# Patient Record
Sex: FEMALE | NOT HISPANIC OR LATINO | URBAN - NONMETROPOLITAN AREA
[De-identification: names, ages, dates, MRNs, and addresses within clinical notes are randomized per-mention and may not be internally consistent; named-entity substitution may affect disease eponyms.]

---

## 2018-01-16 ENCOUNTER — OFFICE VISIT CONVERTED (OUTPATIENT)
Dept: FAMILY MEDICINE CLINIC | Age: 63
End: 2018-01-16
Attending: NURSE PRACTITIONER

## 2018-01-23 ENCOUNTER — CONVERSION ENCOUNTER (OUTPATIENT)
Dept: OTHER | Facility: HOSPITAL | Age: 63
End: 2018-01-23

## 2018-02-14 ENCOUNTER — CONVERSION ENCOUNTER (OUTPATIENT)
Dept: MAMMOGRAPHY | Facility: HOSPITAL | Age: 63
End: 2018-02-14

## 2018-09-06 ENCOUNTER — OFFICE VISIT CONVERTED (OUTPATIENT)
Dept: FAMILY MEDICINE CLINIC | Age: 63
End: 2018-09-06
Attending: NURSE PRACTITIONER

## 2019-03-04 ENCOUNTER — HOSPITAL ENCOUNTER (OUTPATIENT)
Dept: OTHER | Facility: HOSPITAL | Age: 64
Discharge: HOME | End: 2019-03-04
Attending: NURSE PRACTITIONER

## 2019-03-04 ENCOUNTER — OFFICE VISIT CONVERTED (OUTPATIENT)
Dept: FAMILY MEDICINE CLINIC | Age: 64
End: 2019-03-04
Attending: NURSE PRACTITIONER

## 2019-03-04 LAB
ANION GAP SERPL CALC-SCNC: 17 MMOL/L (ref 8–19)
BUN SERPL-MCNC: 10 MG/DL (ref 5–25)
BUN/CREAT SERPL: 11 {RATIO} (ref 6–20)
CALCIUM SERPL-MCNC: 9.9 MG/DL (ref 8.7–10.4)
CHLORIDE SERPL-SCNC: 101 MMOL/L (ref 99–111)
CONV CO2: 27 MMOL/L (ref 22–32)
CREAT UR-MCNC: 0.9 MG/DL (ref 0.5–0.9)
GFR SERPLBLD BASED ON 1.73 SQ M-ARVRAT: >60 ML/MIN/{1.73_M2}
GLUCOSE SERPL-MCNC: 97 MG/DL (ref 65–99)
OSMOLALITY SERPL CALC.SUM OF ELEC: 291 MOSM/KG (ref 273–304)
POTASSIUM SERPL-SCNC: 3.8 MMOL/L (ref 3.5–5.3)
SODIUM SERPL-SCNC: 141 MMOL/L (ref 135–147)

## 2019-03-06 LAB
ALP SERPL-CCNC: 125 U/L (ref 43–160)
ASO AB SERPL-ACNC: 78 [IU]/ML (ref 0–200)
CONV ANTI NUCLEAR ANTIBODY WITH REFLEX: NEGATIVE
CONV RHEUMATOID FACTOR IGM: <10 [IU]/ML (ref 0–14)
CRP SERPL-MCNC: 3.5 MG/L (ref 0–5)
ERYTHROCYTE [SEDIMENTATION RATE] IN BLOOD: 11 MM/H (ref 0–30)
PHOSPHATE SERPL-MCNC: 3.4 MG/DL (ref 2.4–4.5)
URATE SERPL-MCNC: 5.6 MG/DL (ref 2.5–7.5)

## 2019-08-29 ENCOUNTER — OFFICE VISIT CONVERTED (OUTPATIENT)
Dept: FAMILY MEDICINE CLINIC | Age: 64
End: 2019-08-29
Attending: NURSE PRACTITIONER

## 2020-01-06 ENCOUNTER — HOSPITAL ENCOUNTER (OUTPATIENT)
Dept: OTHER | Facility: HOSPITAL | Age: 65
Discharge: HOME | End: 2020-01-06
Attending: NURSE PRACTITIONER

## 2020-01-06 ENCOUNTER — OFFICE VISIT CONVERTED (OUTPATIENT)
Dept: FAMILY MEDICINE CLINIC | Age: 65
End: 2020-01-06
Attending: NURSE PRACTITIONER

## 2020-01-06 LAB
ALBUMIN SERPL-MCNC: 4.4 G/DL (ref 3.5–5)
ALBUMIN/GLOB SERPL: 1.6 {RATIO} (ref 1.4–2.6)
ALP SERPL-CCNC: 119 U/L (ref 43–160)
ALT SERPL-CCNC: 26 U/L (ref 10–40)
ANION GAP SERPL CALC-SCNC: 18 MMOL/L (ref 8–19)
AST SERPL-CCNC: 24 U/L (ref 15–50)
BASOPHILS # BLD MANUAL: 0.05 10*3/UL (ref 0–0.2)
BASOPHILS NFR BLD MANUAL: 0.7 % (ref 0–3)
BILIRUB SERPL-MCNC: 0.32 MG/DL (ref 0.2–1.3)
BUN SERPL-MCNC: 13 MG/DL (ref 5–25)
BUN/CREAT SERPL: 15 {RATIO} (ref 6–20)
CALCIUM SERPL-MCNC: 9.5 MG/DL (ref 8.7–10.4)
CHLORIDE SERPL-SCNC: 100 MMOL/L (ref 99–111)
CONV CO2: 25 MMOL/L (ref 22–32)
CONV TOTAL PROTEIN: 7.2 G/DL (ref 6.3–8.2)
CREAT UR-MCNC: 0.89 MG/DL (ref 0.5–0.9)
DEPRECATED RDW RBC AUTO: 40.6 FL
EOSINOPHIL # BLD MANUAL: 0.37 10*3/UL (ref 0–0.7)
EOSINOPHIL NFR BLD MANUAL: 5 % (ref 0–7)
ERYTHROCYTE [DISTWIDTH] IN BLOOD BY AUTOMATED COUNT: 12.1 % (ref 11.5–14.5)
GFR SERPLBLD BASED ON 1.73 SQ M-ARVRAT: >60 ML/MIN/{1.73_M2}
GLOBULIN UR ELPH-MCNC: 2.8 G/DL (ref 2–3.5)
GLUCOSE SERPL-MCNC: 95 MG/DL (ref 65–99)
GRANS (ABSOLUTE): 3.61 10*3/UL (ref 2–8)
GRANS: 48.8 % (ref 30–85)
HBA1C MFR BLD: 13.5 G/DL (ref 12–16)
HCT VFR BLD AUTO: 40.5 % (ref 37–47)
IMM GRANULOCYTES # BLD: 0.02 10*3/UL (ref 0–0.54)
IMM GRANULOCYTES NFR BLD: 0.3 % (ref 0–0.43)
LYMPHOCYTES # BLD MANUAL: 2.98 10*3/UL (ref 1–5)
LYMPHOCYTES NFR BLD MANUAL: 4.9 % (ref 3–10)
MCH RBC QN AUTO: 30.3 PG (ref 27–31)
MCHC RBC AUTO-ENTMCNC: 33.3 G/DL (ref 33–37)
MCV RBC AUTO: 91 FL (ref 81–99)
MONOCYTES # BLD AUTO: 0.36 10*3/UL (ref 0.2–1.2)
OSMOLALITY SERPL CALC.SUM OF ELEC: 288 MOSM/KG (ref 273–304)
PLATELET # BLD AUTO: 280 10*3/UL (ref 130–400)
PMV BLD AUTO: 10.1 FL (ref 7.4–10.4)
POTASSIUM SERPL-SCNC: 4 MMOL/L (ref 3.5–5.3)
RBC # BLD AUTO: 4.45 10*6/UL (ref 4.2–5.4)
SODIUM SERPL-SCNC: 139 MMOL/L (ref 135–147)
TSH SERPL-ACNC: 2.98 M[IU]/L (ref 0.27–4.2)
VARIANT LYMPHS NFR BLD MANUAL: 40.3 % (ref 20–45)
WBC # BLD AUTO: 7.39 10*3/UL (ref 4.8–10.8)

## 2020-03-05 ENCOUNTER — OFFICE VISIT CONVERTED (OUTPATIENT)
Dept: FAMILY MEDICINE CLINIC | Age: 65
End: 2020-03-05
Attending: NURSE PRACTITIONER

## 2020-06-03 ENCOUNTER — OFFICE VISIT CONVERTED (OUTPATIENT)
Dept: FAMILY MEDICINE CLINIC | Age: 65
End: 2020-06-03
Attending: NURSE PRACTITIONER

## 2020-06-03 ENCOUNTER — HOSPITAL ENCOUNTER (OUTPATIENT)
Dept: OTHER | Facility: HOSPITAL | Age: 65
Discharge: HOME | End: 2020-06-03
Attending: NURSE PRACTITIONER

## 2020-06-03 LAB
APPEARANCE UR: CLEAR
BACTERIA UR QL AUTO: ABNORMAL
BILIRUB UR QL: NEGATIVE
CASTS URNS QL MICRO: ABNORMAL /[LPF]
COLOR UR: YELLOW
CONV LEUKOCYTE ESTERASE: ABNORMAL
CONV UROBILINOGEN IN URINE BY AUTOMATED TEST STRIP: 0.2 {EHRLICHU}/DL (ref 0.1–1)
EPI CELLS #/AREA URNS HPF: ABNORMAL /[HPF]
GLUCOSE 24H UR-MCNC: NEGATIVE MG/DL
HGB UR QL STRIP: ABNORMAL
KETONES UR QL STRIP: NEGATIVE MG/DL
MUCOUS THREADS URNS QL MICRO: ABNORMAL
NITRITE UR-MCNC: NEGATIVE MG/ML
PH UR STRIP.AUTO: 5.5 [PH] (ref 5–8)
PROT UR-MCNC: NEGATIVE MG/DL
RBC # BLD AUTO: ABNORMAL /[HPF]
SP GR UR STRIP: 1.01 (ref 1–1.03)
SPECIMEN SOURCE: ABNORMAL
UNIDENT CRYS URNS QL MICRO: ABNORMAL /[HPF]
WBC #/AREA URNS HPF: ABNORMAL /[HPF]

## 2020-06-05 LAB
AMOXICILLIN+CLAV SUSC ISLT: <=2
AMPICILLIN SUSC ISLT: <=2
AMPICILLIN+SULBAC SUSC ISLT: <=2
BACTERIA UR CULT: ABNORMAL
CEFAZOLIN SUSC ISLT: <=4
CEFEPIME SUSC ISLT: <=1
CEFTAZIDIME SUSC ISLT: <=1
CEFTRIAXONE SUSC ISLT: <=1
CEFUROXIME ORAL SUSC ISLT: 4
CEFUROXIME PARENTER SUSC ISLT: 4
CIPROFLOXACIN SUSC ISLT: <=0.25
ERTAPENEM SUSC ISLT: <=0.5
GENTAMICIN SUSC ISLT: <=1
LEVOFLOXACIN SUSC ISLT: <=0.12
NITROFURANTOIN SUSC ISLT: <=16
TETRACYCLINE SUSC ISLT: <=1
TMP SMX SUSC ISLT: <=20
TOBRAMYCIN SUSC ISLT: <=1

## 2020-08-11 ENCOUNTER — CONVERSION ENCOUNTER (OUTPATIENT)
Dept: FAMILY MEDICINE CLINIC | Age: 65
End: 2020-08-11

## 2020-08-11 ENCOUNTER — HOSPITAL ENCOUNTER (OUTPATIENT)
Dept: OTHER | Facility: HOSPITAL | Age: 65
Discharge: HOME | End: 2020-08-11
Attending: FAMILY MEDICINE

## 2020-08-14 LAB — SARS-COV-2 RNA SPEC QL NAA+PROBE: NOT DETECTED

## 2020-08-18 ENCOUNTER — HOSPITAL ENCOUNTER (OUTPATIENT)
Dept: OTHER | Facility: HOSPITAL | Age: 65
Discharge: HOME | End: 2020-08-18
Attending: NURSE PRACTITIONER

## 2020-08-18 ENCOUNTER — OFFICE VISIT CONVERTED (OUTPATIENT)
Dept: FAMILY MEDICINE CLINIC | Age: 65
End: 2020-08-18
Attending: NURSE PRACTITIONER

## 2020-08-21 LAB — SARS-COV-2 RNA SPEC QL NAA+PROBE: NOT DETECTED

## 2020-11-12 ENCOUNTER — OFFICE VISIT CONVERTED (OUTPATIENT)
Dept: FAMILY MEDICINE CLINIC | Age: 65
End: 2020-11-12
Attending: NURSE PRACTITIONER

## 2020-12-08 ENCOUNTER — OFFICE VISIT CONVERTED (OUTPATIENT)
Dept: FAMILY MEDICINE CLINIC | Age: 65
End: 2020-12-08

## 2020-12-08 ENCOUNTER — HOSPITAL ENCOUNTER (OUTPATIENT)
Dept: OTHER | Facility: HOSPITAL | Age: 65
Discharge: HOME | End: 2020-12-08
Attending: NURSE PRACTITIONER

## 2020-12-08 LAB
ALBUMIN SERPL-MCNC: 4.4 G/DL (ref 3.5–5)
ALBUMIN/GLOB SERPL: 1.6 {RATIO} (ref 1.4–2.6)
ALP SERPL-CCNC: 125 U/L (ref 43–160)
ALT SERPL-CCNC: 19 U/L (ref 10–40)
ANION GAP SERPL CALC-SCNC: 17 MMOL/L (ref 8–19)
AST SERPL-CCNC: 19 U/L (ref 15–50)
BASOPHILS # BLD MANUAL: 0.06 10*3/UL (ref 0–0.2)
BASOPHILS NFR BLD MANUAL: 0.8 % (ref 0–3)
BILIRUB SERPL-MCNC: 0.19 MG/DL (ref 0.2–1.3)
BUN SERPL-MCNC: 13 MG/DL (ref 5–25)
BUN/CREAT SERPL: 15 {RATIO} (ref 6–20)
CALCIUM SERPL-MCNC: 9.7 MG/DL (ref 8.7–10.4)
CHLORIDE SERPL-SCNC: 104 MMOL/L (ref 99–111)
CONV CO2: 25 MMOL/L (ref 22–32)
CONV TOTAL PROTEIN: 7.1 G/DL (ref 6.3–8.2)
CREAT UR-MCNC: 0.86 MG/DL (ref 0.5–0.9)
DEPRECATED RDW RBC AUTO: 42.5 FL
EOSINOPHIL # BLD MANUAL: 0.39 10*3/UL (ref 0–0.7)
EOSINOPHIL NFR BLD MANUAL: 5 % (ref 0–7)
ERYTHROCYTE [DISTWIDTH] IN BLOOD BY AUTOMATED COUNT: 12.5 % (ref 11.5–14.5)
GFR SERPLBLD BASED ON 1.73 SQ M-ARVRAT: >60 ML/MIN/{1.73_M2}
GLOBULIN UR ELPH-MCNC: 2.7 G/DL (ref 2–3.5)
GLUCOSE SERPL-MCNC: 95 MG/DL (ref 65–99)
GRANS (ABSOLUTE): 3.84 10*3/UL (ref 2–8)
GRANS: 49.5 % (ref 30–85)
HBA1C MFR BLD: 13.9 G/DL (ref 12–16)
HCT VFR BLD AUTO: 41.8 % (ref 37–47)
IMM GRANULOCYTES # BLD: 0.01 10*3/UL (ref 0–0.54)
IMM GRANULOCYTES NFR BLD: 0.1 % (ref 0–0.43)
LYMPHOCYTES # BLD MANUAL: 2.9 10*3/UL (ref 1–5)
LYMPHOCYTES NFR BLD MANUAL: 7.2 % (ref 3–10)
MCH RBC QN AUTO: 30.5 PG (ref 27–31)
MCHC RBC AUTO-ENTMCNC: 33.3 G/DL (ref 33–37)
MCV RBC AUTO: 91.9 FL (ref 81–99)
MONOCYTES # BLD AUTO: 0.56 10*3/UL (ref 0.2–1.2)
OSMOLALITY SERPL CALC.SUM OF ELEC: 294 MOSM/KG (ref 273–304)
PLATELET # BLD AUTO: 294 10*3/UL (ref 130–400)
PMV BLD AUTO: 9.5 FL (ref 7.4–10.4)
POTASSIUM SERPL-SCNC: 4.1 MMOL/L (ref 3.5–5.3)
RBC # BLD AUTO: 4.55 10*6/UL (ref 4.2–5.4)
SODIUM SERPL-SCNC: 142 MMOL/L (ref 135–147)
VARIANT LYMPHS NFR BLD MANUAL: 37.4 % (ref 20–45)
WBC # BLD AUTO: 7.76 10*3/UL (ref 4.8–10.8)

## 2021-02-18 ENCOUNTER — OFFICE VISIT CONVERTED (OUTPATIENT)
Dept: FAMILY MEDICINE CLINIC | Age: 66
End: 2021-02-18
Attending: NURSE PRACTITIONER

## 2021-05-18 NOTE — PROGRESS NOTES
"Mandie Peterson 1955     Office/Outpatient Visit    Visit Date: Mon, Mar 4, 2019 05:21 pm    Provider: Elisha Berger N.P. (Assistant: Hayley Berger MA)    Location: [facility]        Electronically signed by Elisha Berger N.P. on  2019 09:19:44 PM                             SUBJECTIVE:        CC:     Ms. Peterson is a 63 year old White female.  Patient complains of sinus congestion and pain in the left leg. swelling in leg;         HPI:         Patient complains of joint pain, other specified site.  The patient notes diffuse joint pain.  This has been a problem for the past 5 months.  Primary joints affected include the lumbar spine,  hips,  knees, and right ankle.  She denies associated symptoms including redness and joint warmth.  Went to PT (started in ) OTC pain relievers without success.  (she has had some swelling below her knee, and in her left leg )         Concerning upper respiratory illness, the symptoms include \"sinus\" headache, nasal congestion and sinus pain/pressure.  She has already tried to relieve the symptoms with antihistamines and decongestants.      ROS:     CONSTITUTIONAL:  Negative for fever.      E/N/T:  Positive for ear pain ( bilateral ), sore throat ( intermittent ) and PND.  bad odor to breath     CARDIOVASCULAR:  Negative for chest pain, palpitations, tachycardia, orthopnea, and edema.      RESPIRATORY:  Negative for recent cough.      PSYCHIATRIC:  Positive for feelings of stress ( (dealing with her mom and work is stressful) ).          PMH/FM/SH:     Last Reviewed on 3/04/2019 05:23 PM by Hayley Berger    Past Medical History:         Endometriosis         GYNECOLOGICAL HISTORY:     miscarriage 1         PREVENTIVE HEALTH MAINTENANCE             COLORECTAL CANCER SCREENING: Up to date (colonoscopy q10y; sigmoidoscopy q5y; Cologuard q3y) was last done , Results are in chart     MAMMOGRAM: was last done 10- stable     PAP " SMEAR: No longer indicated due to age and history         Surgical History:         : X 1;     Hysterectomy: ; at age 25; TAHBSO;     Procedures:    EGD ( 12 )     COLONOSCOPY: was last done 12 with normal results Galvan/internal hemorrhoid         Family History:     Father:  at age 86; Cause of death was CHF     Mother: Type 2 Diabetes     Brother(s): Arrhythmia ( AF );  Mental disability     Sister(s): Healthy; 1 sister(s) total         Social History:     Occupation: [employer]     Marital Status:      Children: 1 child         Tobacco/Alcohol/Supplements:     Last Reviewed on 3/04/2019 05:25 PM by Hayley Berger    Tobacco: She has never smoked.          Substance Abuse History:     Last Reviewed on 3/04/2019 05:23 PM by Hayley Berger    None         Mental Health History:     Last Reviewed on 3/04/2019 05:23 PM by Hayley Berger        Communicable Diseases (eg STDs):     Last Reviewed on 3/04/2019 05:23 PM by Hayley Berger            Immunizations:     zzFluzone pf-quadrivalent 3 and up 2017     Adacel (Tdap) 10/6/2016         Allergies:     Last Reviewed on 3/04/2019 05:23 PM by Hayley Berger      No Known Drug Allergies.         Current Medications:     Last Reviewed on 3/04/2019 05:23 PM by Hayley Berger    Omeprazole 20mg Capsules, Extended Release 1 capsule daily     Wellbutrin SR 150mg Tablets, Sustained Release take one tablet BID     Esgic 50mg/325mg/40mg Tablet Take 2 at onset of headache, then 1 Q 1 TO 2 hrs ha. Max 5 in 12 hours         OBJECTIVE:        Vitals:         Current: 3/4/2019 5:24:22 PM    Ht:  5 ft, 5.25 in;  Wt: 192.4 lbs;  BMI: 31.8    T: 98.3 F (oral);  BP: 145/86 mm Hg (left arm, sitting);  P: 78 bpm (left arm (BP Cuff), sitting);  sCr: 0.87 mg/dL;  GFR: 76.07        Exams:     PHYSICAL EXAM:     GENERAL:  well developed and nourished; appropriately groomed; in no apparent distress;     E/N/T: EARS:  normal external auditory canals  and tympanic membranes;  grossly normal hearing; NOSE: bilateral maxillary sinus tenderness present; OROPHARYNX:  normal mucosa, dentition, gingiva, and posterior pharynx;     RESPIRATORY: normal respiratory rate and pattern with no distress; normal breath sounds with no rales, rhonchi, wheezes or rubs;     CARDIOVASCULAR: normal rate; rhythm is regular;  no systolic murmur;     LYMPHATIC: no enlargement of cervical or facial nodes;     MUSCULOSKELETAL: pain with range of motion in: back flexion; full ROM of knees, no pain to palpation, no redness or increased warmth     PSYCHIATRIC: affect/demeanor: tearful;         ASSESSMENT           719.48   M25.50  Joint pain, other specified site              DDx:     461.8   J01.90  Acute sinusitis, NEC              DDx:     300.4   F34.8  Depression with anxiety              DDx:         ORDERS:         Meds Prescribed:       Amoxicillin 875mg Tablet One PO BID X 10 days.  #20 (Twenty) tablet(s) Refills: 0         Lab Orders:       [id number]  [facility] Arthritis Profile  (Send-Out)         [id number]  [facility] Basic Metabolic Panel  (Send-Out)                   PLAN:          Joint pain, other specified site consider mobic and ortho referral     LABORATORY:  Labs ordered to be performed today include Arthritis Profile and basic metabolic panel.            Orders:       [id number]  [facility] Arthritis Profile  (Send-Out)         [id number]  [facility] Basic Metabolic Panel  (Send-Out)            Acute sinusitis, NEC         RECOMMENDATIONS given include: rest and increase oral fluid intake.      FOLLOW-UP: Advised to call if there is no improvement in 4 day(s).            Prescriptions:       Amoxicillin 875mg Tablet One PO BID X 10 days.  #20 (Twenty) tablet(s) Refills: 0          Depression with anxiety at this time she wants to just continue with her wellbutrin             Patient Recommendations:        For  Joint pain, other specified site:     I also recommend ^.          For  Acute  sinusitis, NEC:     Get plenty of rest. Increase oral fluid intake.              CHARGE CAPTURE           **Please note: ICD descriptions below are intended for billing purposes only and may not represent clinical diagnoses**        Primary Diagnosis:         719.48 Joint pain, other specified site            M25.50    Pain in unspecified joint              Orders:          [id number]   Office/outpatient visit; established patient, level 4  (In-House)           461.8 Acute sinusitis, NEC            J01.90    Acute sinusitis, unspecified    300.4 Depression with anxiety            F34.8    Other persistent mood [affective] disorders

## 2021-05-18 NOTE — PROGRESS NOTES
Mandie Peterson  1955     Office/Outpatient Visit    Visit Date: Thu, Feb 18, 2021 10:04 am    Provider: Elisah Berger N.P. (Assistant: Anabel Berger,  )    Location: [facility]        Electronically signed by Elisha Berger N.P. on  02/18/2021 12:44:01 PM                             Subjective:        CC: Ms. Peterson is a 65 year old White female.  f/u from fall Sunday and to [facility] ER on 2/17/21; [employer]Know[employer] video [phone]         HPI:           Today's encounter is being done with a telehealth visit. She has consented verbally with two witnesses for todays treatment. Todays visit is being conducted by audio and video. Individuals present during the telemedicine consultation include sonal Lockwood &  SHANTEL Rizo Ms. Peterson presents in follow up from ER. She was seen in the ER on 2-16-21.  She was diagnosed with acute undisplaced fracture right L 3 transverse process.  The following radiology tests were done: hip x-ray ( right negative ), CT hip neg, CT lumbar spine, fracture L 3, lumbar spine  x-ray nothing acute seen.  The patient received the following prescriptions: Oxycodone/ flexeril / diclofenac.  The patient's course has not improved.  Aggravating factors include movement in general.  Symptoms are relieved with Lying flat.  Fall at home 2-14-21, slipped down from top concrete stop to bottom step, landed her lower back against the bottom step ( I imported her rx from pharmacy and she was given flexeril 10 mg #15, Diclofenac #20 and Oxycodone #12)    ROS:     CONSTITUTIONAL:  Negative for fever.      CARDIOVASCULAR:  Negative for chest pain.      RESPIRATORY:  Negative for recent cough and dyspnea.      GASTROINTESTINAL:  Negative for bowel movement since going to the ER.      GENITOURINARY:  Negative for dysuria and hematuria.      MUSCULOSKELETAL:  Positive for pain in lower back when she has to get up to use the restroom.      NEUROLOGICAL:  Negative for paresthesias.           Past Medical History / Family History / Social History:         Last Reviewed on 2021 11:40 AM by Elisha Berger    Past Medical History:         Endometriosis         GYNECOLOGICAL HISTORY:     miscarriage 1         PREVENTIVE HEALTH MAINTENANCE             COLORECTAL CANCER SCREENING: Up to date (colonoscopy q10y; sigmoidoscopy q5y; Cologuard q3y) was last done , Results are in chart     MAMMOGRAM: was last done 10- stable     PAP SMEAR: No longer indicated due to age and history         Surgical History:         : X 1;     Hysterectomy: ; at age 25; TAHBSO;     Procedures:    EGD ( 12 )     COLONOSCOPY: was last done 12 with normal results Galvan/internal hemorrhoid         Family History:     Father:  at age 86; Cause of death was CHF     Mother: Type 2 Diabetes     Brother(s): Arrhythmia ( AF );  Mental disability     Sister(s): 1 sister(s) total;  COPD         Social History:     Occupation: [employer]     Marital Status:      Children: 1 child         Tobacco/Alcohol/Supplements:     Last Reviewed on 2021 11:53 AM by Elisha Berger    Tobacco: She has never smoked.          Immunizations:     influenza, injectable, quadrivalent, preservative free 2020    zzFluzone pf-quadrivalent 3 and up 2017    Fluzone Quadrivalent (3+ years) 2018    Adacel (Tdap) 10/6/2016        Allergies:     Last Reviewed on 2021 10:08 AM by Anabel Berger    No Known Allergies.        Current Medications:     Last Reviewed on 2021 10:08 AM by Anabel Berger    Wellbutrin  mg oral Tablet, Extended Release 24 hr [TAKE ONE TABLET BY MOUTH DAILY]    Esgic 50mg/325mg/40mg Tablet [Take 2 at onset of headache, then 1 Q 1 TO 2 hrs ha. Max 5 in 12 hours]    omeprazole 20 mg oral capsule,delayed release (enteric coated) [TAKE ONE CAPSULE BY MOUTH DAILY]    albuterol sulfate 90 mcg/actuation Inhalation HFA Aerosol Inhaler [inhale 1 - 2  puffs (90 - 180 mcg) by inhalation route every 4 hours as needed]    cyclobenzaprine 10 mg oral tablet    oxyCODONE-acetaminophen 5-325 mg oral tablet    diclofenac sodium 75 mg oral tablet, delayed release (enteric coated) [take 1 tablet (75 mg) by oral route 2 times per day]        Objective:        Exams:     PHYSICAL EXAM:     GENERAL: vital signs recorded - well developed, well nourished;  no apparent distress;     RESPIRATORY: normal appearance and symmetric expansion of chest wall;     MUSCULOSKELETAL: she is lying in her bed     NEUROLOGIC: GROSSLY INTACT     PSYCHIATRIC:  appropriate affect and demeanor; normal speech pattern; grossly normal memory;         Assessment:         M54.5   Low back pain       Z79.899   Other long term (current) drug therapy       S32.9XXA   Fracture of unspecified parts of lumbosacral spine and pelvis, initial encounter for closed fracture           ORDERS:         Meds Prescribed:       [New Rx] HYDROcodone-acetaminophen 5-325 mg oral tablet [take 1 tablet by oral route every 4 hours as needed for pain], #30 (thirty) tablets, Refills: 0 (zero)       [Refilled] cyclobenzaprine 10 mg oral tablet [take 1 tablet (10 mg) by oral route 3 times per day prn muscle pain ], #60 (sixty) tablets, Refills: 0 (zero)       [Refilled] diclofenac sodium 75 mg oral tablet, delayed release (enteric coated) [take 1 tablet (75 mg) by oral route 2 times per day with food ], #60 (sixty) tablets, Refills: 0 (zero)         Procedures Ordered:       REFER  Referral to Specialist or Other Facility  (Send-Out)                      Plan:         Low back painsent for and reviewed x-ray's and CT's from [facility], her son is with her to assist her, will cover work, ER had recommended she stay off work 4-6 weeks, will complete her FMLA / she needs to continue to use NSAID and muscle relaxer and pain rx as needed, consulted with Dr Bonny billy refill of rx and referral (discussed using stool softner, Miralax, lax if  needed, she only as metamucil at home)    Telehealth: Verbal consent obtained for visit to occur via televideo conferencing     FOLLOW-UP: Advised to call if there is no improvement in 5 day(s).            Prescriptions:       [Refilled] cyclobenzaprine 10 mg oral tablet [take 1 tablet (10 mg) by oral route 3 times per day prn muscle pain ], #60 (sixty) tablets, Refills: 0 (zero)       [Refilled] diclofenac sodium 75 mg oral tablet, delayed release (enteric coated) [take 1 tablet (75 mg) by oral route 2 times per day with food ], #60 (sixty) tablets, Refills: 0 (zero)         Other long term (current) drug therapyreviewed controlled medications/ our consent form; harmeet cooper, not able to collect a UDS today        Fracture of unspecified parts of lumbosacral spine and pelvis, initial encounter for closed fractureconsulted with Dr. Draper, will refer to neurosurgeon for a consult, will cover her with  hydrocodone instead of oxycodone, see how she does         REFERRALS:  Referral initiated to a neurosurgeon ( for evaluation of acute non displace fracture of right L 3 transverse process / s/p fall 2-14-21 ).            Prescriptions:       [New Rx] HYDROcodone-acetaminophen 5-325 mg oral tablet [take 1 tablet by oral route every 4 hours as needed for pain], #30 (thirty) tablets, Refills: 0 (zero)           Orders:       REFER  Referral to Specialist or Other Facility  (Send-Out)                  Charge Capture:         Primary Diagnosis:     M54.5  Low back pain           Orders:      [id number]  Office/outpatient visit; established patient, level 4  (In-House)              Z79.899  Other long term (current) drug therapy     S32.9XXA  Fracture of unspecified parts of lumbosacral spine and pelvis, initial encounter for closed fracture

## 2021-05-18 NOTE — PROGRESS NOTES
Mandie Peterson 1955     Office/Outpatient Visit    Visit Date: Thu, Aug 29, 2019 10:16 am    Provider: Elisha Berger N.P. (Assistant: Natalee Jefferson MA)    Location: [facility]        Electronically signed by Elisha Berger N.P. on  2019 12:49:11 PM                             SUBJECTIVE:        CC: (NOT TAKING ESGIC) out of esgic /ab     Ms. Peterson is a 64 year old White female.  She presents with nausea, h/a and upset stomach..          HPI:         Patient complains of common migraine.  Onset was approximately four days ago.  The pain is diffuse with no specific location.  She has had prior headaches similar to this one.  Associated symptoms include nausea and burning eyes.      ROS:     CONSTITUTIONAL:  Negative for chills, fatigue, fever, and weight change.      E/N/T:  Positive for sinus pressure.      CARDIOVASCULAR:  Negative for chest pain, palpitations, tachycardia, orthopnea, and edema.      RESPIRATORY:  Negative for cough, dyspnea, and hemoptysis.      GASTROINTESTINAL:  Positive for acid reflux symptoms ( on prilosec ) and diarrhea ( 4-5 days ).      NEUROLOGICAL:  Positive for feels shaky and light headed.      PSYCHIATRIC:  Positive for feelings of stress ( (work hectic, niece stole from her a week ago) ) and irritable.          [facility]/FM/:     Last Reviewed on 2019 10:37 AM by Elisha Berger    Past Medical History:         Endometriosis         GYNECOLOGICAL HISTORY:     miscarriage 1         PREVENTIVE HEALTH MAINTENANCE             COLORECTAL CANCER SCREENING: Up to date (colonoscopy q10y; sigmoidoscopy q5y; Cologuard q3y) was last done , Results are in chart     MAMMOGRAM: was last done 10- stable     PAP SMEAR: No longer indicated due to age and history         Surgical History:         : X 1;     Hysterectomy: ; at age 25; TAHBSO;     Procedures:    EGD ( 12 )     COLONOSCOPY: was last done 12 with normal  results Galvan/internal hemorrhoid         Family History:     Father:  at age 86; Cause of death was CHF     Mother: Type 2 Diabetes     Brother(s): Arrhythmia ( AF );  Mental disability     Sister(s): 1 sister(s) total;  COPD         Social History:     Occupation: [employer]     Marital Status:      Children: 1 child         Tobacco/Alcohol/Supplements:     Last Reviewed on 2019 10:21 AM by Natalee Jefferson    Tobacco: She has never smoked.          Substance Abuse History:     Last Reviewed on 3/04/2019 05:23 PM by Hayley Berger    None         Mental Health History:     Last Reviewed on 3/04/2019 05:23 PM by Hayley Berger        Communicable Diseases (eg STDs):     Last Reviewed on 3/04/2019 05:23 PM by Hayley Berger            Immunizations:     zzFluzone pf-quadrivalent 3 and up 2017     Fluzone Quadrivalent (3+ years) 2018     Adacel (Tdap) 10/6/2016         Allergies:     Last Reviewed on 2019 10:21 AM by Natalee Jefferson      No Known Drug Allergies.         Current Medications:     Last Reviewed on 2019 10:21 AM by Natalee Jefferson    Omeprazole 20mg Capsules, Extended Release 1 capsule daily     Wellbutrin SR 150mg Tablets, Sustained Release take one tablet BID     Esgic 50mg/325mg/40mg Tablet Take 2 at onset of headache, then 1 Q 1 TO 2 hrs ha. Max 5 in 12 hours         OBJECTIVE:        Vitals:         Current: 2019 10:22:25 AM    Ht:  5 ft, 5.25 in;  Wt: 195.4 lbs;  BMI: 32.3    T: 97.3 F (oral);  BP: 127/73 mm Hg (left arm, sitting);  P: 67 bpm (left arm (BP Cuff), sitting);  sCr: 0.9 mg/dL;  GFR: 73.12        Exams:     PHYSICAL EXAM:     GENERAL:  well developed and nourished; appropriately groomed; in no apparent distress;     EYES: PERRLA;     E/N/T: EARS:  normal external auditory canals and tympanic membranes;  grossly normal hearing; NOSE:  normal nasal mucosa, septum, turbinates, and sinuses; OROPHARYNX:  normal mucosa, dentition, gingiva, and  posterior pharynx;     NECK: carotid exam reveals no bruits;     RESPIRATORY: normal respiratory rate and pattern with no distress; normal breath sounds with no rales, rhonchi, wheezes or rubs;     CARDIOVASCULAR: normal rate; rhythm is regular;  no systolic murmur;     LYMPHATIC: no enlargement of cervical or facial nodes;     NEUROLOGIC: GROSSLY INTACT     PSYCHIATRIC: affect/demeanor: tearful;         Procedures:     Common migraine     1. Toradol 60 mg given IM in the right hip; administered by KG;  lot number -[id number]; expires 07/01/2020             ASSESSMENT           346.10   G43.009  Common migraine              DDx:     300.4   F34.8  Depression with anxiety              DDx:         ORDERS:         Meds Prescribed:       Wellbutrin XL (Bupropion HCl) 300mg Tablets, Extended Release 1 tab daily  #30 (Thirty) tablet(s) Refills: 1       Promethazine HCl 25mg Tablet 1/2 - 1 tab q 4-6 hrs prn   prn nausea and vomiting  #20 (Twenty) tablet(s) Refills: 0         Other Orders:       [id number]  Therapeutic injection  (In-House)           Toradol, per 15 mg (x4)                 PLAN:          Common migraine try zyrtec and flonase /cautioned on sedative effect of phenergan     Narcotic or pain medication Toradol 60 mg     FOLLOW-UP: Advised to call if there is no improvement in 1-2 day(s).            Prescriptions:       Promethazine HCl 25mg Tablet 1/2 - 1 tab q 4-6 hrs prn   prn nausea and vomiting  #20 (Twenty) tablet(s) Refills: 0           Orders:       [id number]  Therapeutic injection  (In-House)                     Toradol, per 15 mg (x4)          Depression with anxiety switch formulation of her wellbutrin, she was missing the second dose of her rx         FOLLOW-UP: Advised to call if there is no improvement in 3-4 week(s).            Prescriptions:       Wellbutrin XL (Bupropion HCl) 300mg Tablets, Extended Release 1 tab daily  #30 (Thirty) tablet(s) Refills: 1             CHARGE CAPTURE            **Please note: ICD descriptions below are intended for billing purposes only and may not represent clinical diagnoses**        Primary Diagnosis:         346.10 Common migraine            G43.009    Migraine without aura, not intractable, without status migrainosus              Orders:          [id number]   Office/outpatient visit; established patient, level 4  (In-House)             34701   Therapeutic injection  (In-House)                                           Toradol, per 15 mg (x4)         300.4 Depression with anxiety            F34.8    Other persistent mood [affective] disorders

## 2021-05-18 NOTE — PROGRESS NOTES
"Mandie PetersonMackenzie 1955     Office/Outpatient Visit    Visit Date: Thu, Sep 6, 2018 11:20 am    Provider: Elisha Berger N.P. (Assistant: Hayley Berger MA)    Location: [facility]        Electronically signed by Elisha Berger N.P. on  2018 02:27:24 PM                             SUBJECTIVE:        CC:     Ms. Peterson is a 63 year old White female.  Patient is here for routine check up and sinus congestion.;         HPI:         Ms. Petesron presents with acid reflux disease.  Symptoms are improved with a proton-pump inhibitor ( Prilosec ).          Upper respiratory illness details; the symptoms include \"sinus\" headache, nasal congestion, maxillary sinus pain/pressure and odor to her drainage/bad taste.  She has already tried to relieve the symptoms with antihistamines and decongestants.  Pertinent medical history is unremarkable.      ROS:     CONSTITUTIONAL:  Negative for fever.      E/N/T:  Positive for ear pain ( bilateral ), sore throat ( intermittent ) and PND.      CARDIOVASCULAR:  Negative for chest pain, palpitations, tachycardia, orthopnea, and edema.      RESPIRATORY:  Negative for recent cough.      NEUROLOGICAL:  Positive for light headed.      ENDOCRINE:  Positive for off estrace 4 months.      PSYCHIATRIC:  Positive for feelings of stress and irritable.  work and dealing with her niece is stressful, she is having anger, irritable, and is tearful         PMH/FMH/SH:     Last Reviewed on 2018 11:31 AM by Elisha Berger    Past Medical History:         Endometriosis         GYNECOLOGICAL HISTORY:     miscarriage 1         PREVENTIVE HEALTH MAINTENANCE             COLORECTAL CANCER SCREENING: Up to date (colonoscopy q10y; sigmoidoscopy q5y; Cologuard q3y) was last done , Results are in chart     MAMMOGRAM: was last done 10- stable     PAP SMEAR: No longer indicated due to age and history         Surgical History:         : X 1;     Hysterectomy: " 1980; at age 25; TAHBSO;     Procedures:    EGD ( 7-12-12 )     COLONOSCOPY: was last done 7-12-12 with normal results Galvan/internal hemorrhoid         Family History:     Reviewed.         Social History:     Occupation: [employer]     Marital Status:      Children: 1 child         Tobacco/Alcohol/Supplements:     Last Reviewed on 9/06/2018 11:25 AM by Hayley Berger    Tobacco: She has never smoked.              Immunizations:     zzFluzone pf-quadrivalent 3 and up 11/1/2017     Adacel (Tdap) 10/6/2016         Allergies:     Last Reviewed on 9/06/2018 11:23 AM by Hayley Berger      No Known Drug Allergies.         Current Medications:     Last Reviewed on 9/06/2018 11:23 AM by Hayley Berger    Omeprazole 20mg Capsules, Extended Release 1 capsule daily     Wellbutrin SR 150mg Tablets, Sustained Release take one tablet BID     Esgic 50mg/325mg/40mg Tablet Take 2 at onset of headache, then 1 Q 1 TO 2 hrs ha. Max 5 in 12 hours         OBJECTIVE:        Vitals:         Current: 9/6/2018 11:23:21 AM    Ht:  5 ft, 5.25 in;  Wt: 189.8 lbs;  BMI: 31.3    T: 98.7 F (oral);  BP: 132/72 mm Hg (left arm, sitting);  P: 67 bpm (left arm (BP Cuff), sitting);  sCr: 0.87 mg/dL;  GFR: 75.64        Exams:     PHYSICAL EXAM:     GENERAL:  well developed and nourished; appropriately groomed; in no apparent distress;     E/N/T: EARS:  normal external auditory canals and tympanic membranes;  grossly normal hearing; NOSE: bilateral maxillary sinus tenderness present; OROPHARYNX: posterior pharynx shows erythema;     RESPIRATORY: normal respiratory rate and pattern with no distress; normal breath sounds with no rales, rhonchi, wheezes or rubs;     CARDIOVASCULAR: normal rate; rhythm is regular;  no systolic murmur;     LYMPHATIC: no enlargement of cervical or facial nodes;     PSYCHIATRIC:  appropriate affect and demeanor; normal speech pattern; grossly normal memory;         ASSESSMENT           530.81   K21.0  Acid reflux  disease              DDx:     461.8   J01.90  Acute sinusitis, NEC              DDx:     300.4   F34.8  Depression with anxiety              DDx:         ORDERS:         Meds Prescribed:       Refill of: Omeprazole 20mg Capsules, Extended Release 1 capsule daily  #90 (Ninety) capsule(s) Refills: 1       Refill of: Wellbutrin SR (Bupropion HCl) 150mg Tablets, Sustained Release take one tablet BID  #180 (One [place] and Eighty) tablet(s) Refills: 1       Zoloft (Sertraline HCl) 50mg Tablet 1 a day  #30 (Thirty) tablet(s) Refills: 1       Amoxicillin 875mg Tablet One PO BID X 10 days.  #20 (Twenty) tablet(s) Refills: 0                 PLAN:          Acid reflux disease           Prescriptions:       Refill of: Omeprazole 20mg Capsules, Extended Release 1 capsule daily  #90 (Ninety) capsule(s) Refills: 1          Acute sinusitis, NEC         RECOMMENDATIONS given include: rest and increase oral fluid intake.      FOLLOW-UP: Advised to call if there is no improvement in 4 day(s).            Prescriptions:       Amoxicillin 875mg Tablet One PO BID X 10 days.  #20 (Twenty) tablet(s) Refills: 0          Depression with anxiety will add zoloft  and see how she does          FOLLOW-UP: Advised to call if there is no improvement in 1-2 month(s).            Prescriptions:       Refill of: Wellbutrin SR (Bupropion HCl) 150mg Tablets, Sustained Release take one tablet BID  #180 (One [place] and Eighty) tablet(s) Refills: 1       Zoloft (Sertraline HCl) 50mg Tablet 1 a day  #30 (Thirty) tablet(s) Refills: 1             CHARGE CAPTURE           **Please note: ICD descriptions below are intended for billing purposes only and may not represent clinical diagnoses**        Primary Diagnosis:         530.81 Acid reflux disease            K21.0    Gastro-esophageal reflux disease with esophagitis              Orders:          67666   Office/outpatient visit; established patient, level 4 (20 minutes)  (In-House)           461.8 Acute  sinusitis, NEC            J01.90    Acute sinusitis, unspecified    300.4 Depression with anxiety            F34.8    Other persistent mood [affective] disorders

## 2021-05-18 NOTE — PROGRESS NOTES
Mandie Peterson  1955     Office/Outpatient Visit    Visit Date: Wed, Bunny 3, 2020 09:19 am    Provider: Elisha Berger N.P. (Assistant: Leta Mcdonough MA)    Location: [facility], [place]        Electronically signed by Elisha Berger N.P. on  2020 11:32:34 AM                             Subjective:        CC: DISCUSS WELLBUTRINCONSENT BY ALICIA   [phone]  AUDIO ONLYMs. Peterson is a 65 year old White female.  She presents with reoccuring UTI.          HPI:           Ms. Peterson presents with dysuria.  This began within the last 6 months.  Associated symptoms include abdominal pain ( suprapubic ) and urinary frequency.  Treatments tried thus far include Increasing fluid intake.  flared again yesterday (flared in the last 24 hours) Today's encounter is being done with a telehealth visit. She has consented verbally with two witnesses for todays treatment. Todays visit is being conducted by audio only. Individuals present during the telemedicine consultation include maritza Lockwood and SHANTEL Quintanilla     ROS:     CONSTITUTIONAL:  Positive for chills ( mild ).   Negative for fever.      CARDIOVASCULAR:  Negative for chest pain, palpitations, tachycardia, orthopnea, and edema.      RESPIRATORY:  Negative for recent cough and dyspnea.      GENITOURINARY:  Negative for hematuria.      MUSCULOSKELETAL:  Positive for back pain ( low back pain intermittently ).      NEUROLOGICAL:  Negative for dizziness, headaches, paresthesias, and weakness.          Past Medical History / Family History / Social History:         Last Reviewed on 2020 10:35 AM by Elisha Berger    Past Medical History:         Endometriosis         GYNECOLOGICAL HISTORY:     miscarriage 1         PREVENTIVE HEALTH MAINTENANCE             COLORECTAL CANCER SCREENING: Up to date (colonoscopy q10y; sigmoidoscopy q5y; Cologuard q3y) was last done , Results are in chart     MAMMOGRAM: was last done 10-  stable     PAP SMEAR: No longer indicated due to age and history         Surgical History:         : X 1;     Hysterectomy: ; at age 25; TAHBSO;     Procedures:    EGD ( 12 )     COLONOSCOPY: was last done 12 with normal results Galvan/internal hemorrhoid         Family History:     Father:  at age 86; Cause of death was CHF     Mother: Type 2 Diabetes     Brother(s): Arrhythmia ( AF );  Mental disability     Sister(s): 1 sister(s) total;  COPD         Social History:     Occupation: [employer]     Marital Status:      Children: 1 child         Tobacco/Alcohol/Supplements:     Last Reviewed on 2020 09:22 AM by Leta Mcdonough    Tobacco: She has never smoked.          Substance Abuse History:     Last Reviewed on 3/04/2019 05:23 PM by Hayley Berger    None         Mental Health History:     Last Reviewed on 3/04/2019 05:23 PM by Hayley Berger        Communicable Diseases (eg STDs):     Last Reviewed on 3/04/2019 05:23 PM by Hayley Berger        Immunizations:     influenza, injectable, quadrivalent, preservative free 2020    zzFluzone pf-quadrivalent 3 and up 2017    Fluzone Quadrivalent (3+ years) 2018    Adacel (Tdap) 10/6/2016        Allergies:     Last Reviewed on 2020 09:21 AM by Leta Mcdonough    No Known Allergies.        Current Medications:     Last Reviewed on 2020 09:22 AM by Leta Mcdonough    Wellbutrin  mg oral Tablet, Extended Release 24 hr [TAKE ONE TABLET BY MOUTH DAILY]    Esgic 50mg/325mg/40mg Tablet [Take 2 at onset of headache, then 1 Q 1 TO 2 hrs ha. Max 5 in 12 hours]    hydrOXYzine HCL 25 mg oral tablet [take 1 tablet (25 mg) by oral route at HS]        Assessment:         R30.0   Dysuria           ORDERS:         Lab Orders:       [id number]  [facility] Urinalysis, automated, with micro  (Send-Out)                      Plan:         Dysuriadiscussed IC    LABORATORY:  Labs ordered to be performed today include  urinalysis with micro.  Telehealth: Verbal consent obtained for visit to occur via phone call; Total time spent was 5 minutes; [id number]--Abhsbevna E/M 5-10 minutes     RECOMMENDATIONS given include: increase oral fluid intake     FOLLOW-UP: come in today for u/a, order to  / pending lab           Orders:       [id number]  [facility] Urinalysis, automated, with micro  (Send-Out)                Patient Education Handouts:       Bladder Infection (Women)              Patient Recommendations:        For  Dysuria:    Increase your intake of oral fluids.              Charge Capture:         Primary Diagnosis:     R30.0  Dysuria           Orders:      [id number]  Phys/QHP telephone evaluation 5-10 min  (In-House)

## 2021-05-18 NOTE — PROGRESS NOTES
Mandie Peterson 1955     Office/Outpatient Visit    Visit Date: Tue, Aug 18, 2020 2:34 pm    Provider: Yasmine Card N.P. (Assistant: Anabel Moralez MA )    Location: [facility]        Electronically signed by Yasmine Card N.P. on  2020 09:08:37 PM                             Subjective:        CC: Ms. Peterson is a 65 year old White female.  Fatigue, chest heaviness, shortness of air, headache, body aches.;         HPI:           Ms. Peterson presents with other fatigue.  Other details: fatigue and body aches  and diarrhea started over one week ago.  covid 19 negative aug. 11.    diarrhea and body aches resolved.  continues with heaviness of chest , shortness of breath and moderate fatigue.  afebrile.  returned to work yesterday.  very occasional , non poductive cough..        see HPI above    ROS:     CONSTITUTIONAL:  Positive for fatigue ( moderate ).   Negative for chills, fever or night sweats.      E/N/T:  Negative for hearing problems, E/N/T pain, congestion, rhinorrhea, epistaxis, hoarseness, and dental problems.      CARDIOVASCULAR:  Negative for chest pain, palpitations and pedal edema.      RESPIRATORY:  Positive for dyspnea.   Negative for recent cough.      GASTROINTESTINAL:  Positive for diarrhea ( resolved ).   Negative for abdominal pain, nausea or vomiting.      MUSCULOSKELETAL:  Positive for myalgias (resolved).      INTEGUMENTARY/BREAST:  Negative for rash.      NEUROLOGICAL:  Negative for dizziness, headaches, paresthesias, and weakness.          Past Medical History / Family History / Social History:         Last Reviewed on 2020 10:35 AM by Elisha Berger    Past Medical History:         Endometriosis         GYNECOLOGICAL HISTORY:     miscarriage 1         PREVENTIVE HEALTH MAINTENANCE             COLORECTAL CANCER SCREENING: Up to date (colonoscopy q10y; sigmoidoscopy q5y; Cologuard q3y) was last done , Results are in chart     MAMMOGRAM:  was last done 10- stable     PAP SMEAR: No longer indicated due to age and history         Surgical History:         : X 1;     Hysterectomy: ; at age 25; TAHBSO;     Procedures:    EGD ( 12 )     COLONOSCOPY: was last done 12 with normal results Galvan/internal hemorrhoid         Family History:     Father:  at age 86; Cause of death was CHF     Mother: Type 2 Diabetes     Brother(s): Arrhythmia ( AF );  Mental disability     Sister(s): 1 sister(s) total;  COPD         Social History:     Occupation: [employer]     Marital Status:      Children: 1 child         Tobacco/Alcohol/Supplements:     Last Reviewed on 2020 09:22 AM by Leta Mcdonough    Tobacco: She has never smoked.          Substance Abuse History:     Last Reviewed on 3/04/2019 05:23 PM by Hayley Berger    None         Mental Health History:     Last Reviewed on 3/04/2019 05:23 PM by Hayley Berger        Communicable Diseases (eg STDs):     Last Reviewed on 3/04/2019 05:23 PM by Hayley Berger        Immunizations:     influenza, injectable, quadrivalent, preservative free 2020    zzFluzone pf-quadrivalent 3 and up 2017    Fluzone Quadrivalent (3+ years) 2018    Adacel (Tdap) 10/6/2016        Allergies:     Last Reviewed on 2020 09:21 AM by Leta Mcdonough    No Known Allergies.        Current Medications:     Last Reviewed on 2020 02:39 PM by Anabel Moralez    Wellbutrin  mg oral Tablet, Extended Release 24 hr [TAKE ONE TABLET BY MOUTH DAILY]    Esgic 50mg/325mg/40mg Tablet [Take 2 at onset of headache, then 1 Q 1 TO 2 hrs ha. Max 5 in 12 hours]    omeprazole 20 mg oral capsule,delayed release (enteric coated) [TAKE ONE CAPSULE BY MOUTH DAILY]    Macrobid 100 mg oral capsule [take 1 capsule (100 mg) by oral route 2 times per day with food]        Objective:        Vitals:         Historical:     3/5/2020  BP:   133/81 mm Hg ( (right arm, , sitting, );) 3/5/2020  Wt:    194.8lbs    Current: 8/18/2020 2:43:12 PM    Ht:  5 ft, 5.25 in;  Wt: 194 lbs;  BMI: 32.0T: 98 F (oral);  BP: 136/79 mm Hg (right arm, sitting);  P: 74 bpm (right arm (BP Cuff), sitting);  sCr: 0.89 mg/dL;  GFR: 72.80        Exams:     PHYSICAL EXAM:     GENERAL: tired-appearing;     E/N/T: EARS: bilateral TMs are normal;  OROPHARYNX: posterior pharynx, including tonsils, tongue, and uvula are normal;     RESPIRATORY: normal respiratory rate and pattern with no distress; normal breath sounds with no rales, rhonchi, wheezes or rubs;     CARDIOVASCULAR: normal rate; rhythm is regular;     GASTROINTESTINAL: nontender; normal bowel sounds; no organomegaly;     MUSCULOSKELETAL:  Normal range of motion, strength and tone;     NEUROLOGIC: mental status: alert and oriented x 3; GROSSLY INTACT     PSYCHIATRIC:  appropriate affect and demeanor; normal speech pattern; grossly normal memory;         Assessment:         R53.83   Other fatigue       R06.02   Shortness of breath           ORDERS:         Meds Prescribed:       [New Rx] albuterol sulfate 90 mcg/actuation Inhalation HFA Aerosol Inhaler [inhale 1 - 2 puffs (90 - 180 mcg) by inhalation route every 4 hours as needed], #8.5 (eight point five) grams, Refills: 0 (zero)         Radiology/Test Orders:       [id number]  COVID 19 Testing [facility]  (Send-Out)            [id number]  Radiologic exam chest 2 views  (Send-Out)                      Plan:         Other fatigue        it is likely that she has had a false negative covid 19 test.  repeat today.  self isolate until results rec'd.  monitor closely.          Shortness of breath        RADIOLOGY:  I have ordered a chest x-ray (PA and lateral) to be done today.            Prescriptions:       [New Rx] albuterol sulfate 90 mcg/actuation Inhalation HFA Aerosol Inhaler [inhale 1 - 2 puffs (90 - 180 mcg) by inhalation route every 4 hours as needed], #8.5 (eight point five) grams, Refills: 0 (zero)           Orders:       [id number]  COVID 19  Testing [facility]  (Send-Out)            [id number]  Radiologic exam chest 2 views  (Send-Out)                  Charge Capture:         Primary Diagnosis:     R53.83  Other fatigue           Orders:      [id number]  Office/outpatient visit; established patient, level 3  (In-House)              R06.02  Shortness of breath

## 2021-05-18 NOTE — PROGRESS NOTES
Mandie Peterson  1955     Office/Outpatient Visit    Visit Date: Tue, Dec 8, 2020 02:12 pm    Provider: Chloe Bermeo N.P. (Assistant: Brigida Ochoa, )    Location: [facility]        Electronically signed by Chloe Bermeo N.P. on  2020 03:22:33 PM                             Subjective:        CC: Ms. Peterson is a 65 year old White female.  ongoing symptoms, cough, chest hurts with cough ([facility] [phone]);         HPI: Informed patient that as visit is being performed as a telehealth visit there will be no opportunity to obtain vital signs or perform physical exam.  Due to this there is unfortunately a possibility that things may be missed that would typically be noticed during a traditionally visit.  Patient is aware of this possibility and agrees to proceed with telehealth.  Patient states there is no other person present for this phone call    ROS:     CONSTITUTIONAL:  Negative for chills, fatigue and fever.      EYES:  Negative for blurred vision.      E/N/T:  Negative for ear pain, nasal congestion, frequent rhinorrhea, hoarseness, sinus pressure and sore throat.      CARDIOVASCULAR:  Negative for chest pain and pedal edema.      RESPIRATORY:  Negative for recent cough and dyspnea.      GASTROINTESTINAL:  Negative for abdominal pain, constipation, diarrhea, nausea and vomiting.      GENITOURINARY:  Negative for dysuria and frequent urination.      MUSCULOSKELETAL:  Negative for myalgias.      NEUROLOGICAL:  Negative for headaches.      PSYCHIATRIC:  Negative for anxiety, depression, and sleep disturbances.          Past Medical History / Family History / Social History:         Last Reviewed on 2020 02:20 PM by Chloe Bermeo    Past Medical History:         Endometriosis         GYNECOLOGICAL HISTORY:     miscarriage 1         PREVENTIVE HEALTH MAINTENANCE             COLORECTAL CANCER SCREENING: Up to date (colonoscopy q10y; sigmoidoscopy q5y; Cologuard q3y)  was last done , Results are in chart     MAMMOGRAM: was last done 10- stable     PAP SMEAR: No longer indicated due to age and history         Surgical History:         : X 1;     Hysterectomy: ; at age 25; TAHBSO;     Procedures:    EGD ( 12 )     COLONOSCOPY: was last done 12 with normal results Galvan/internal hemorrhoid         Family History:     Father:  at age 86; Cause of death was CHF     Mother: Type 2 Diabetes     Brother(s): Arrhythmia ( AF );  Mental disability     Sister(s): 1 sister(s) total;  COPD         Social History:     Occupation: [employer]     Marital Status:      Children: 1 child         Tobacco/Alcohol/Supplements:     Last Reviewed on 2020 02:20 PM by Chloe Bermeo    Tobacco: She has never smoked.          Substance Abuse History:     Last Reviewed on 2020 02:20 PM by Chloe Bermeo    None         Mental Health History:     Last Reviewed on 3/04/2019 05:23 PM by Hayley Berger        Communicable Diseases (eg STDs):     Last Reviewed on 3/04/2019 05:23 PM by Hayley Berger        Immunizations:     influenza, injectable, quadrivalent, preservative free 2020    zzFluzone pf-quadrivalent 3 and up 2017    Fluzone Quadrivalent (3+ years) 2018    Adacel (Tdap) 10/6/2016        Allergies:     Last Reviewed on 2020 02:20 PM by Chloe Bermeo    No Known Allergies.        Current Medications:     Last Reviewed on 2020 02:20 PM by Chloe Bermeo    Wellbutrin  mg oral Tablet, Extended Release 24 hr [TAKE ONE TABLET BY MOUTH DAILY]    Esgic 50mg/325mg/40mg Tablet [Take 2 at onset of headache, then 1 Q 1 TO 2 hrs ha. Max 5 in 12 hours]    omeprazole 20 mg oral capsule,delayed release (enteric coated) [TAKE ONE CAPSULE BY MOUTH DAILY]    Macrobid 100 mg oral capsule [take 1 capsule (100 mg) by oral route 2 times per day with food]    albuterol sulfate 90 mcg/actuation Inhalation HFA Aerosol Inhaler [inhale  1 - 2 puffs (90 - 180 mcg) by inhalation route every 4 hours as needed]    Augmentin 875-125 mg oral tablet [take 1 tablet by oral route every 12 hours for 10 days]        Objective:        Vitals: telehealth        Exams:     PHYSICAL EXAM:     GENERAL:  well developed and nourished; appropriately groomed; in no apparent distress;     EYES: extraocular movements intact; conjunctiva and cornea are normal;     RESPIRATORY: normal respiratory rate and pattern with no distress;     MUSCULOSKELETAL: normal overall tone     NEUROLOGIC: mental status: alert and oriented x 3;     PSYCHIATRIC: appropriate affect and demeanor; normal psychomotor function; normal speech pattern;         Assessment:         R05   Cough           ORDERS:         Radiology/Test Orders:       [id number]  Radiologic exam chest 2 views  (Send-Out)              Lab Orders:       [id number]  [facility] CBC with 3 part diff  (Send-Out)            [id number]  [facility] Comp. Metabolic Panel  (Send-Out)            [id number]  [facility] Bordetella pertusis by PCR  (Send-Out)                      Plan:         CoughPatient will be coming to have chest x-ray and labs performed.  Patient was given information on how to contact the radiology department when she is here due to Covid risk and cough.  Will review labs and x-ray when they come back and discussed with patient.    LABORATORY:  Labs ordered to be performed today include CBC, Comprehensive metabolic panel, and Bordella Pertussis.      RADIOLOGY:  I have ordered a chest x-ray (PA and lateral) to be done today.  Telehealth: Verbal consent obtained for visit to occur via televideo conferencing; Staff, other than provider, present during telephone visit include Brigida Bauer LPN; Total time spent was 12 minutes; [id number]--Athecezlu E/M 11-20 minutes           Orders:       [id number]  [facility] CBC with 3 part diff  (Send-Out)            [id number]  [facility] Comp. Metabolic Panel  (Send-Out)            12026  [facility]  Bordetella pertusis by PCR  (Send-Out)            31604  Radiologic exam chest 2 views  (Send-Out)                  Charge Capture:         Primary Diagnosis:     R05  Cough           Orders:      [id number]  Phys/QHP telephone evaluation 11-20 minutes  (In-House)

## 2021-05-18 NOTE — PROGRESS NOTES
Mandie PetersonMackenzie 1955     Office/Outpatient Visit    Visit Date:  03:55 pm    Provider: Elisha Berger N.P. (Assistant: Lucie Peterson MA)    Location: [facility]        Electronically signed by Elisha Berger N.P. on  2018 04:32:35 PM                             SUBJECTIVE:        CC:     Ms. Peterson is a 62 year old White female.  Breast exam, sinus infection, headaches;         HPI:         Patient presents with screening breast exam - other.  Her latest mammogram was .          With regard to the upper respiratory illness, these have been present for the past 2 weeks.  The symptoms include ear congestion,  headache, nasal congestion and maxillary sinus pain/pressure.  She has already tried to relieve the symptoms with mixed cold/sinus preparations and steroid nasal spray.      ROS:     CONSTITUTIONAL:  Negative for fever.      E/N/T:  Negative for sore throat.      CARDIOVASCULAR:  Negative for chest pain, palpitations, tachycardia, orthopnea, and edema.      RESPIRATORY:  Positive for recent cough ( PND ).      INTEGUMENTARY/BREAST:  Positive for performance of self breast exams ( on a monthly basis ).          [facility]/:     Last Reviewed on 2018 04:19 PM by Elisha Berger    Past Medical History:         Endometriosis         GYNECOLOGICAL HISTORY:     miscarriage 1         PREVENTIVE HEALTH MAINTENANCE             COLORECTAL CANCER SCREENING: Up to date (colonoscopy q10y; sigmoidoscopy q5y; Cologuard q3y) was last done , Results are in chart     MAMMOGRAM: was last done 10- stable     PAP SMEAR: No longer indicated due to age and history         Surgical History:         : X 1;     Hysterectomy: ; at age 25; TAHBSO;     Procedures:    EGD ( 12 )     COLONOSCOPY: was last done 12 with normal results Galvan/internal hemorrhoid         Family History:     Father:  at age 86; Cause of death was CHF      Mother: Type 2 Diabetes     Brother(s): Arrhythmia ( AF );  Mental disability     Sister(s): Healthy; 1 sister(s) total         Social History:     Occupation: [employer]     Marital Status:      Children: 1 child         Tobacco/Alcohol/Supplements:     Last Reviewed on 1/16/2018 03:58 PM by Lucie Peterson    Tobacco: She has never smoked.              Immunizations:     Adacel (Tdap) 10/6/2016         Allergies:     Last Reviewed on 1/16/2018 03:58 PM by Lucie Peterson      No Known Drug Allergies.         Current Medications:     Last Reviewed on 1/16/2018 03:59 PM by Lucie Peterson    Etodolac 400mg Tablet One tablet BID with food     Prilosec 20mg Capsules, Extended Release ONE A DAY     Wellbutrin SR 150mg Tablets, Sustained Release take one tablet BID     Esgic 50mg/325mg/40mg Tablet Take 2 at onset of headache, then 1 Q 1 TO 2 hrs ha. Max 5 in 12 hours         OBJECTIVE:        Vitals:         Current: 1/16/2018 3:57:53 PM    Ht:  5 ft, 5.25 in;  Wt: 189.2 lbs;  BMI: 31.2    T: 97 F (oral);  BP: 126/79 mm Hg (left arm, sitting);  P: 80 bpm (left arm (BP Cuff), sitting);  sCr: 0.87 mg/dL;  GFR: 76.46        Exams:     PHYSICAL EXAM:     GENERAL:  well developed and nourished; appropriately groomed; in no apparent distress;     E/N/T: EARS:  normal external auditory canals and tympanic membranes;  grossly normal hearing; NOSE: right maxillary sinus tenderness present; OROPHARYNX: posterior pharynx shows light puruelent PND;     RESPIRATORY: normal respiratory rate and pattern with no distress; normal breath sounds with no rales, rhonchi, wheezes or rubs;     CARDIOVASCULAR: normal rate; rhythm is regular;  no systolic murmur;     LYMPHATIC: no enlargement of cervical or facial nodes; no axillary adenopathy;     BREAST/INTEGUMENT: BREASTS: breast exam is normal without masses, skin changes, or nipple discharge;     PSYCHIATRIC:  appropriate affect and demeanor; normal speech pattern; grossly normal  memory;         ASSESSMENT           V76.19   Z12.39  Screening breast exam - other              DDx:     461.8   J01.90  Acute sinusitis, other              DDx:         ORDERS:         Meds Prescribed:       Amoxicillin 875mg Tablet One PO BID X 10 days.  #20 (Twenty) tablet(s) Refills: 0         Radiology/Test Orders:       [id number]  Screening mammography bi 2-view inc CAD  (Send-Out)                   PLAN:          Screening breast exam - other         RADIOLOGY:  I have ordered screening mammogram to be done today.            Orders:       [id number]  Screening mammography bi 2-view inc CAD  (Send-Out)            Acute sinusitis, other         RECOMMENDATIONS given include: rest, increase oral fluid intake, and continue flonase.      FOLLOW-UP: Advised to call if there is no improvement in 4 day(s).            Prescriptions:       Amoxicillin 875mg Tablet One PO BID X 10 days.  #20 (Twenty) tablet(s) Refills: 0             Patient Recommendations:        For  Acute sinusitis, other:     Get plenty of rest. Increase oral fluid intake.              CHARGE CAPTURE           **Please note: ICD descriptions below are intended for billing purposes only and may not represent clinical diagnoses**        Primary Diagnosis:         V76.19 Screening breast exam - other            Z12.39    Encounter for other screening for malignant neoplasm of breast              Orders:          [id number]   Office/outpatient visit; established patient, level 4  (In-House)           461.8 Acute sinusitis, other            J01.90    Acute sinusitis, unspecified

## 2021-05-18 NOTE — PROGRESS NOTES
Mandie Peterson  1955     Office/Outpatient Visit    Visit Date:  11:05 am    Provider: Elisha Berger N.P. (Assistant: Gaby Cazares MA)    Location: [facility]        Electronically signed by Elisha Berger N.P. on  2020 03:35:03 PM                             Subjective:        CC: Ms. Peterson is a 64 year old White female.  Patient presents today for medication refills (not taking Esgic); symptoms since she went  off HRT,   at least the last 6 months        HPI:           PHQ-9 Depression Screening: Completed form scanned and in chart; Total Score 17           Gastro-esophageal reflux disease with esophagitis details; symptoms are improved with a proton-pump inhibitor ( Prilosec ).        anxiety    On wellbutrin but not controlling her symptoms and has not worked adequately in the last 6 months Her work is her main stressor.      ROS:     CONSTITUTIONAL:  Negative for chills, fatigue, fever, and weight change.      CARDIOVASCULAR:  Negative for chest pain, palpitations, tachycardia, orthopnea, and edema.      RESPIRATORY:  Negative for cough, dyspnea, and hemoptysis.      NEUROLOGICAL:  Negative for dizziness, headaches, paresthesias, and weakness.      ENDOCRINE:  Positive for low libido and night sweats.   Negative for hot flashes.  off HRT for six months     PSYCHIATRIC:  Positive for difficulty concentrating, varies with difficulty at times with insomnia and irritable.   Negative for suicidal thoughts.          Past Medical History / Family History / Social History:         Last Reviewed on 2020 11:23 AM by Elisha Berger    Past Medical History:         Endometriosis         GYNECOLOGICAL HISTORY:     miscarriage 1         PREVENTIVE HEALTH MAINTENANCE             COLORECTAL CANCER SCREENING: Up to date (colonoscopy q10y; sigmoidoscopy q5y; Cologuard q3y) was last done , Results are in chart     MAMMOGRAM: was last done 10-  stable     PAP SMEAR: No longer indicated due to age and history         Surgical History:         : X 1;     Hysterectomy: ; at age 25; TAHBSO;     Procedures:    EGD ( 12 )     COLONOSCOPY: was last done 12 with normal results Galvan/internal hemorrhoid         Family History:     Father:  at age 86; Cause of death was CHF     Mother: Type 2 Diabetes     Brother(s): Arrhythmia ( AF );  Mental disability     Sister(s): 1 sister(s) total;  COPD         Social History:     Occupation: [employer]     Marital Status:      Children: 1 child         Tobacco/Alcohol/Supplements:     Last Reviewed on 2020 11:08 AM by Gaby Cazares    Tobacco: She has never smoked.          Substance Abuse History:     Last Reviewed on 3/04/2019 05:23 PM by Hayley Berger    None         Mental Health History:     Last Reviewed on 3/04/2019 05:23 PM by Hayley Berger        Communicable Diseases (eg STDs):     Last Reviewed on 3/04/2019 05:23 PM by Hayley Berger        Immunizations:     zzFluzone pf-quadrivalent 3 and up 2017    Fluzone Quadrivalent (3+ years) 2018    Adacel (Tdap) 10/6/2016        Allergies:     Last Reviewed on 2020 11:07 AM by Gaby Cazares    No Known Allergies.        Current Medications:     Last Reviewed on 2020 11:07 AM by Gaby Cazares    Wellbutrin  mg oral Tablet, Extended Release 24 hr [TAKE ONE TABLET BY MOUTH DAILY]    Esgic 50mg/325mg/40mg Tablet [Take 2 at onset of headache, then 1 Q 1 TO 2 hrs ha. Max 5 in 12 hours]    Omeprazole 20 mg oral capsule,delayed release (enteric coated) [1 capsule daily]        Objective:        Vitals:         Current: 2020 11:09:18 AM    Ht:  5 ft, 5.25 in;  Wt: 195.6 lbs;  BMI: 32.3T: 98.9 F (oral);  BP: 132/84 mm Hg (left arm, sitting);  P: 76 bpm (left arm (BP Cuff), sitting);  sCr: 0.9 mg/dL;  GFR: 73.15        Exams:     PHYSICAL EXAM:     GENERAL: vital signs recorded - well developed, well  nourished;  no apparent distress;     NECK:  supple;     RESPIRATORY: normal respiratory rate and pattern with no distress; normal breath sounds with no rales, rhonchi, wheezes or rubs;     CARDIOVASCULAR: normal rate; rhythm is regular;  no systolic murmur; no edema;     PSYCHIATRIC:  appropriate affect and demeanor; normal speech pattern; grossly normal memory;         Assessment:         Z13.31   Encounter for screening for depression       K21.0   Gastro-esophageal reflux disease with esophagitis       F34.8   Other persistent mood [affective] disorders       780.79   Fatigue   (Mild)     R53.83   Other fatigue           ORDERS:         Meds Prescribed:       [Refilled] omeprazole 20 mg oral capsule,delayed release (enteric coated) [1 capsule daily], #90 (ninety) capsules, Refills: 1 (one)         Lab Orders:       [id number]  BDCBC - [facility] CBC with 3 part diff  (Send-Out)            [id number]  COMP - [facility] Comp. Metabolic Panel  (Send-Out)            [id number]  TSH - [facility] TSH  (Send-Out)              Other Orders:         Depression screen positive and follow up plan documented  (In-House)                      Plan:         Encounter for screening for depressionkrbrain     [facility] PHQ-9 Depression Screening: Completed form scanned and in chart; Total Score 17 Positive Depression Screen: after labs, will make a change in rx           Orders:         Depression screen positive and follow up plan documented  (In-House)              Gastro-esophageal reflux disease with esophagitis          Prescriptions:       [Refilled] omeprazole 20 mg oral capsule,delayed release (enteric coated) [1 capsule daily], #90 (ninety) capsules, Refills: 1 (one)         Other persistent mood [affective] disordersconsidering adding  lexapro, will check labs first, may need referral/ therapy/psych        Other fatiguesleeps varies     LABORATORY:  Labs ordered to be performed today include CBC, Comprehensive metabolic panel, and TSH.      FOLLOW-UP:  pending lab results           Orders:       [id number]  BDCBC - [facility] CBC with 3 part diff  (Send-Out)            [id number]  COMP - [facility] Comp. Metabolic Panel  (Send-Out)            [id number]  TSH - [facility] TSH  (Send-Out)                  Charge Capture:         Primary Diagnosis:     Z13.31  Encounter for screening for depression           Orders:      [id number]  Office/outpatient visit; established patient, level 3  (In-House)              Depression screen positive and follow up plan documented  (In-House)              K21.0  Gastro-esophageal reflux disease with esophagitis     F34.8  Other persistent mood [affective] disorders     780.79  Fatigue     R53.83  Other fatigue         ADDENDUMS:      ____________________________________    Addendum: 01/07/2020 10:20 PM - Elisha Berger        Add [id number]; Remove [id number]

## 2021-05-18 NOTE — PROGRESS NOTES
Mandie Peterson  1955     Office/Outpatient Visit    Visit Date:  03:03 pm    Provider: Barbara Ramsay N.P. (Assistant: Dasha Carlos, )    Location: [facility]        Electronically signed by Barbara Ramsay N.P. on  2020 03:43:34 PM                             Subjective:        CC: Ms. Peterson is a 65 year old White female.  congestion, sinus pressure;         HPI:           Patient complains of acute upper respiratory infection, unspecified.  These have been present for the past 3 weeks.  The symptoms include ear complaints, headache and sore throat.  She denies cough or shortness of breath.  She denies exposure to ill contacts.  She has already tried to relieve the symptoms with Advil cold and sinus and Mucinex, Flonase, sinus rinses.      ROS:     CONSTITUTIONAL:  Negative for chills and fever.      EYES:  Negative for eye drainage and eye pain.      E/N/T:  Positive for ear pain and sore throat.      CARDIOVASCULAR:  Negative for chest pain and palpitations.      RESPIRATORY:  Negative for dyspnea and frequent wheezing.      GASTROINTESTINAL:  Negative for abdominal pain and vomiting.      NEUROLOGICAL:  Positive for headaches.   Negative for fainting.          Past Medical History / Family History / Social History:         Last Reviewed on 2020 10:35 AM by Elisha Berger    Past Medical History:         Endometriosis         GYNECOLOGICAL HISTORY:     miscarriage 1         PREVENTIVE HEALTH MAINTENANCE             COLORECTAL CANCER SCREENING: Up to date (colonoscopy q10y; sigmoidoscopy q5y; Cologuard q3y) was last done , Results are in chart     MAMMOGRAM: was last done 10- stable     PAP SMEAR: No longer indicated due to age and history         Surgical History:         : X 1;     Hysterectomy: ; at age 25; TAHBSO;     Procedures:    EGD ( 12 )     COLONOSCOPY: was last done 12 with normal results  Galvan/internal hemorrhoid         Family History:     Father:  at age 86; Cause of death was CHF     Mother: Type 2 Diabetes     Brother(s): Arrhythmia ( AF );  Mental disability     Sister(s): 1 sister(s) total;  COPD         Social History:     Occupation: [employer]     Marital Status:      Children: 1 child         Tobacco/Alcohol/Supplements:     Last Reviewed on 2020 02:39 PM by Anabel Moralez    Tobacco: She has never smoked.          Substance Abuse History:     Last Reviewed on 3/04/2019 05:23 PM by Hayley Berger    None         Mental Health History:     Last Reviewed on 3/04/2019 05:23 PM by Hayley Berger        Communicable Diseases (eg STDs):     Last Reviewed on 3/04/2019 05:23 PM by Hayley Berger        Current Problems:     Last Reviewed on 3/04/2019 05:23 PM by Hayley Berger    Allergies    Gastro-esophageal reflux disease with esophagitis    Acid reflux disease    IBS, constipating type    Irritable bowel syndrome without diarrhea    Other persistent mood [affective] disorders    Depression with anxiety    Fatigue    Other fatigue    Pain in unspecified joint    Joint pain, NEC    Common migraine    Migraine without aura, not intractable, without status migrainosus    Joint pain, other specified site    Encounter for screening for depression    Rash and other nonspecific skin eruption    Dysuria    Encounter for screening for other viral diseases    Diarrhea, unspecified    Shortness of breath        Immunizations:     influenza, injectable, quadrivalent, preservative free 2020    zzFluzone pf-quadrivalent 3 and up 2017    Fluzone Quadrivalent (3+ years) 2018    Adacel (Tdap) 10/6/2016        Allergies:     Last Reviewed on 2020 02:39 PM by Anabel Moralez    No Known Allergies.        Current Medications:     Last Reviewed on 2020 02:39 PM by Anabel Moralez    Wellbutrin  mg oral Tablet, Extended Release 24 hr [TAKE ONE TABLET BY MOUTH  DAILY]    Esgic 50mg/325mg/40mg Tablet [Take 2 at onset of headache, then 1 Q 1 TO 2 hrs ha. Max 5 in 12 hours]    omeprazole 20 mg oral capsule,delayed release (enteric coated) [TAKE ONE CAPSULE BY MOUTH DAILY]    Macrobid 100 mg oral capsule [take 1 capsule (100 mg) by oral route 2 times per day with food]    albuterol sulfate 90 mcg/actuation Inhalation HFA Aerosol Inhaler [inhale 1 - 2 puffs (90 - 180 mcg) by inhalation route every 4 hours as needed]        Objective:        Vitals:         Current: 11/12/2020 3:05:44 PM    Ht:  5 ft, 5.25 in;  Wt: 186.5 lbs;  BMI: 30.8T: 97.5 F (temporal);  BP: 139/67 mm Hg (left arm, sitting);  P: 70 bpm (left arm (BP Cuff), sitting);  sCr: 0.89 mg/dL;  GFR: 71.59        Exams:     PHYSICAL EXAM:     GENERAL: well developed, well nourished;  no apparent distress;     EYES: PERRL, EOMI     E/N/T: EARS: external auditory canal normal;  both TMs are dull;  NOSE: normal turbinates; bilateral maxillary and bilateral frontal sinus tenderness present; OROPHARYNX: oral mucosa is normal; posterior pharynx shows no exudate;     NECK: range of motion is normal; trachea is midline;     RESPIRATORY: normal respiratory rate and pattern with no distress; normal breath sounds with no rales, rhonchi, wheezes or rubs;     CARDIOVASCULAR: normal rate; rhythm is regular;     NEUROLOGIC: mental status: alert and oriented x 3; GROSSLY INTACT     PSYCHIATRIC: appropriate affect and demeanor;         Assessment:         J01.80   Other acute sinusitis           ORDERS:         Meds Prescribed:       [New Rx] Augmentin 875-125 mg oral tablet [take 1 tablet by oral route every 12 hours for 10 days], #20 (twenty) tablets, Refills: 0 (zero)                 Plan:         Other acute sinusitisStop antihistamine for short term to help facilitate drainage. Continue sinus rinses. Declines coronavirus testing as no known exposure.        RECOMMENDATIONS given include: Push Fluids, Rest, Follow up if no  improvement or worsening symptoms like high fevers, vomiting, weakness, or increasing shortness of air.    .      FOLLOW-UP: Chronic visit follow up           Prescriptions:       [New Rx] Augmentin 875-125 mg oral tablet [take 1 tablet by oral route every 12 hours for 10 days], #20 (twenty) tablets, Refills: 0 (zero)             Charge Capture:         Primary Diagnosis:     J01.80  Other acute sinusitis           Orders:      [id number]  Office/outpatient visit; established patient, level 3  (In-House)

## 2021-07-01 VITALS
BODY MASS INDEX: 32.55 KG/M2 | WEIGHT: 195.4 LBS | HEART RATE: 67 BPM | HEIGHT: 65 IN | TEMPERATURE: 97.3 F | SYSTOLIC BLOOD PRESSURE: 127 MMHG | DIASTOLIC BLOOD PRESSURE: 73 MMHG

## 2021-07-01 VITALS
WEIGHT: 189.2 LBS | SYSTOLIC BLOOD PRESSURE: 126 MMHG | BODY MASS INDEX: 31.52 KG/M2 | DIASTOLIC BLOOD PRESSURE: 79 MMHG | HEIGHT: 65 IN | TEMPERATURE: 97 F | HEART RATE: 80 BPM

## 2021-07-01 VITALS
BODY MASS INDEX: 32.06 KG/M2 | HEART RATE: 78 BPM | WEIGHT: 192.4 LBS | DIASTOLIC BLOOD PRESSURE: 86 MMHG | HEIGHT: 65 IN | TEMPERATURE: 98.3 F | SYSTOLIC BLOOD PRESSURE: 145 MMHG

## 2021-07-01 VITALS
TEMPERATURE: 98.7 F | HEIGHT: 65 IN | BODY MASS INDEX: 31.62 KG/M2 | DIASTOLIC BLOOD PRESSURE: 72 MMHG | WEIGHT: 189.8 LBS | SYSTOLIC BLOOD PRESSURE: 132 MMHG | HEART RATE: 67 BPM

## 2021-07-02 VITALS
SYSTOLIC BLOOD PRESSURE: 132 MMHG | BODY MASS INDEX: 32.59 KG/M2 | HEIGHT: 65 IN | TEMPERATURE: 98.9 F | DIASTOLIC BLOOD PRESSURE: 84 MMHG | HEART RATE: 76 BPM | WEIGHT: 195.6 LBS

## 2021-07-02 VITALS
BODY MASS INDEX: 31.07 KG/M2 | HEART RATE: 70 BPM | TEMPERATURE: 97.5 F | WEIGHT: 186.5 LBS | SYSTOLIC BLOOD PRESSURE: 139 MMHG | HEIGHT: 65 IN | DIASTOLIC BLOOD PRESSURE: 67 MMHG

## 2021-07-02 VITALS — BODY MASS INDEX: 32.17 KG/M2 | HEIGHT: 65 IN | TEMPERATURE: 98.2 F

## 2021-07-02 VITALS
WEIGHT: 194.8 LBS | SYSTOLIC BLOOD PRESSURE: 133 MMHG | HEART RATE: 70 BPM | DIASTOLIC BLOOD PRESSURE: 81 MMHG | TEMPERATURE: 97.8 F | HEIGHT: 65 IN | BODY MASS INDEX: 32.46 KG/M2

## 2021-07-02 VITALS
WEIGHT: 194 LBS | DIASTOLIC BLOOD PRESSURE: 79 MMHG | HEIGHT: 65 IN | HEART RATE: 74 BPM | SYSTOLIC BLOOD PRESSURE: 136 MMHG | BODY MASS INDEX: 32.32 KG/M2 | TEMPERATURE: 98 F

## 2021-08-02 DIAGNOSIS — K21.00 GASTROESOPHAGEAL REFLUX DISEASE WITH ESOPHAGITIS, UNSPECIFIED WHETHER HEMORRHAGE: Primary | ICD-10-CM

## 2021-08-02 RX ORDER — OMEPRAZOLE 20 MG/1
20 CAPSULE, DELAYED RELEASE ORAL DAILY
Qty: 90 CAPSULE | Refills: 0 | Status: SHIPPED | OUTPATIENT
Start: 2021-08-02

## 2021-08-02 RX ORDER — OMEPRAZOLE 20 MG/1
20 CAPSULE, DELAYED RELEASE ORAL DAILY
COMMUNITY
End: 2021-08-02 | Stop reason: SDUPTHER

## 2021-08-02 NOTE — TELEPHONE ENCOUNTER
Pharmacy requesting refill on omeprazole    LAST OV: 2/18/21   NEXT OV: none   LAST REFILL: 4/19/21  LAST LAB/UDS: 12/8/20

## 2021-10-29 DIAGNOSIS — K21.00 GASTROESOPHAGEAL REFLUX DISEASE WITH ESOPHAGITIS, UNSPECIFIED WHETHER HEMORRHAGE: ICD-10-CM

## 2021-10-29 RX ORDER — OMEPRAZOLE 20 MG/1
CAPSULE, DELAYED RELEASE ORAL
Qty: 90 CAPSULE | Refills: 0 | OUTPATIENT
Start: 2021-10-29

## 2021-10-29 NOTE — TELEPHONE ENCOUNTER
Rx Refill Note  Requested Prescriptions     Pending Prescriptions Disp Refills   • omeprazole (priLOSEC) 20 MG capsule [Pharmacy Med Name: OMEPRAZOLE DR 20 MG CAPSULE] 90 capsule 0     Sig: TAKE ONE CAPSULE BY MOUTH DAILY      Last office visit with prescribing clinician: 02/18/21    Next office visit with prescribing clinician: Visit date not found.    Lab:CBC & Differential (12/08/2020 15:06)  Called pt to schedule a f/u appt and she said she has moved away and doesn't take any meds anymore. Requested we deny it. Done.    Lola Young LPN  10/29/21, 16:45 EDT

## 2024-10-09 NOTE — PROGRESS NOTES
"Subjective   Patient ID: Shanon Malhotra \"Mikel" is a 62 y.o. female who presents for Follow-up (6 month/+Recurring Covid symptoms ), Anxiety (Pt states she would like to get back on anxiety medication), Arthritis, and Obesity.  Anxiety        Arthritis      Patient is here today for 6 mo follow up     Pt has had increasing anxiety.   She reports that since she had covid back in the summer she has had hot flashes at night, cold sores.       Review of Systems   Musculoskeletal:  Positive for arthritis.       Objective   /70   Pulse 90   Ht 1.727 m (5' 8\")   Wt 102 kg (225 lb)   BMI 34.21 kg/m²     Physical Exam  Constitutional:       General: She is not in acute distress.     Appearance: Normal appearance.   HENT:      Head: Normocephalic.      Nose: Nose normal.      Mouth/Throat:      Pharynx: No oropharyngeal exudate.   Eyes:      General:         Right eye: No discharge.         Left eye: No discharge.      Extraocular Movements: Extraocular movements intact.      Pupils: Pupils are equal, round, and reactive to light.   Cardiovascular:      Rate and Rhythm: Normal rate and regular rhythm.      Heart sounds: No murmur heard.     No gallop.   Pulmonary:      Effort: Pulmonary effort is normal. No respiratory distress.      Breath sounds: Normal breath sounds. No wheezing.   Musculoskeletal:         General: No swelling. Normal range of motion.   Skin:     General: Skin is warm and dry.      Coloration: Skin is not jaundiced.   Neurological:      General: No focal deficit present.      Mental Status: She is alert and oriented to person, place, and time.      Cranial Nerves: No cranial nerve deficit.   Psychiatric:         Mood and Affect: Mood normal.         Behavior: Behavior normal.           Assessment/Plan   Problem List Items Addressed This Visit       HTN (hypertension)    Multiple premature ventricular complexes - Primary    Relevant Orders    Referral to Cardiology    RESOLVED: Psoriatic arthritis " Mandie Peterson  1955     Office/Outpatient Visit    Visit Date: Thu, Mar 5, 2020 09:49 am    Provider: Elisha Berger N.P. (Assistant: Nina Mallory MA)    Location: [facility]        Electronically signed by Elisha Berger N.P. on  2020 10:16:29 AM                             Subjective:        CC: Ms. Peterson is a 64 year old White female.  presents today due to rash pt says she isnt taking lexapro;         HPI:           Ms. Peterson presents with rash and other nonspecific skin eruption.  This has been a problem for the past 4 days.  The rash is widespread and diffuse in its location.  The rash is moderately pruritic.  No contributing factors are identified.  better with epsom salt soaks (has tried benadryl) lives alone, does have a dog, no exposures at work and no new products    ROS:     CONSTITUTIONAL:  Negative for fever.      CARDIOVASCULAR:  Negative for chest pain, palpitations, tachycardia, orthopnea, and edema.      RESPIRATORY:  Negative for cough, dyspnea, and hemoptysis.      NEUROLOGICAL:  Negative for dizziness, headaches, paresthesias, and weakness.      PSYCHIATRIC:  Positive for feelings of stress ( (but dealing with, did not care for lexapro but is on her wellbutrin) ) and irritable.          Past Medical History / Family History / Social History:         Last Reviewed on 3/05/2020 10:12 AM by Elisha Berger    Past Medical History:         Endometriosis         GYNECOLOGICAL HISTORY:     miscarriage 1         PREVENTIVE HEALTH MAINTENANCE             COLORECTAL CANCER SCREENING: Up to date (colonoscopy q10y; sigmoidoscopy q5y; Cologuard q3y) was last done , Results are in chart     MAMMOGRAM: was last done 10- stable     PAP SMEAR: No longer indicated due to age and history         Surgical History:         : X 1;     Hysterectomy: ; at age 25; TAHBSO;     Procedures:    EGD ( 12 )     COLONOSCOPY: was last done  (Multi)    Anxiety    Class 2 obesity with body mass index (BMI) of 35.0 to 35.9 in adult    Positive MARIJA (antinuclear antibody)     Other Visit Diagnoses       Mixed hyperlipidemia        Relevant Medications    rosuvastatin (Crestor) 5 mg tablet    BMI 37.0-37.9, adult        Relevant Medications    tirzepatide, weight loss, (Zepbound) 2.5 mg/0.5 mL injection    Thrush        Relevant Medications    fluconazole (Diflucan) 150 mg tablet    Anxiety disorder, unspecified        Relevant Medications    escitalopram (Lexapro) 10 mg tablet        Immunizations   Flu shot   COVID received   PNA --   Shingles recommended   RSV     Mammo 1/24   DEXA --  Pap will schedule  Colon cancer screening 6/23    1. Psoriatic arthritis   - bloodwork wnl, marija positive 1:40   - did see Dr Otero Rheum  - she does think her pain was better on the cymbalta      2. Anxiety   - restart lexapro 10mg po daily      3. PVCs  - continue propanol prn    - does have family history of Cad so will refer to cardiology for eval        4. Obesity, BMI 38  - resent zepbound with bmi attached , she is going to restart with zepbound   - has tried and failed traditional weight loss with caloric restriction and exercise      5. HLD   - had leg pain with statin   - will try crestor   - recheck lipid in 6 mo     6. tHRUSH   - sent diflucan   Final diagnoses:   [E78.2] Mixed hyperlipidemia   [Z68.37] BMI 37.0-37.9, adult   [I49.3] Multiple premature ventricular complexes   [B37.0] Thrush   [F41.9] Anxiety disorder, unspecified   [I10] Primary hypertension   [F41.9] Anxiety   [E66.812, E66.01, Z68.35] Class 2 severe obesity due to excess calories with serious comorbidity and body mass index (BMI) of 35.0 to 35.9 in adult   [L40.50] Psoriatic arthritis (Multi)   [R76.8] Positive MARIJA (antinuclear antibody)      12 with normal results Galvan/internal hemorrhoid         Family History:     Father:  at age 86; Cause of death was CHF     Mother: Type 2 Diabetes     Brother(s): Arrhythmia ( AF );  Mental disability     Sister(s): 1 sister(s) total;  COPD         Social History:     Occupation: [employer]     Marital Status:      Children: 1 child         Tobacco/Alcohol/Supplements:     Last Reviewed on 3/05/2020 09:52 AM by Nina Mallory    Tobacco: She has never smoked.          Substance Abuse History:     Last Reviewed on 3/04/2019 05:23 PM by Hayley Berger    None         Mental Health History:     Last Reviewed on 3/04/2019 05:23 PM by Hayley Berger        Communicable Diseases (eg STDs):     Last Reviewed on 3/04/2019 05:23 PM by Hayley Berger        Immunizations:     influenza, injectable, quadrivalent, preservative free 2020    zzFluzone pf-quadrivalent 3 and up 2017    Fluzone Quadrivalent (3+ years) 2018    Adacel (Tdap) 10/6/2016        Allergies:     Last Reviewed on 3/05/2020 09:52 AM by Nina Mallory    No Known Allergies.        Current Medications:     Last Reviewed on 3/05/2020 09:52 AM by Nina Mallory    Wellbutrin  mg oral Tablet, Extended Release 24 hr [TAKE ONE TABLET BY MOUTH DAILY]    Esgic 50mg/325mg/40mg Tablet [Take 2 at onset of headache, then 1 Q 1 TO 2 hrs ha. Max 5 in 12 hours]    omeprazole 20 mg oral capsule,delayed release (enteric coated) [1 capsule daily]        Objective:        Vitals:         Current: 3/5/2020 9:54:17 AM    Ht:  5 ft, 5.25 in;  Wt: 194.8 lbs;  BMI: 32.2T: 97.8 F (oral);  BP: 133/81 mm Hg (right arm, sitting);  P: 70 bpm (right arm (BP Cuff), sitting);  sCr: 0.89 mg/dL;  GFR: 73.84        Exams:     PHYSICAL EXAM:     GENERAL: vital signs recorded - well developed, well nourished;  no apparent distress;     RESPIRATORY: normal respiratory rate and pattern with no distress; normal breath sounds with no rales,  rhonchi, wheezes or rubs;     CARDIOVASCULAR: normal rate; rhythm is regular;  no systolic murmur;     BREAST/INTEGUMENT: seborrheic keratoses; no hives noted; several small light seb  keratosis noted    PSYCHIATRIC:  appropriate affect and demeanor; normal speech pattern; grossly normal memory;         Assessment:         R21   Rash and other nonspecific skin eruption           ORDERS:         Meds Prescribed:       [New Rx] hydrOXYzine HCL 25 mg oral tablet [take 1 tablet (25 mg) by oral route at HS], #30 (thirty) tablets, Refills: 1 (one)                 Plan:         Rash and other nonspecific skin eruptiontake OTC zyrtec during daytime, hydroxyzine at HS, may need pepcid too/consider seeing derm regarding her seb keratosis        RECOMMENDATIONS given include: continue epsom salt soaks.      FOLLOW-UP: Advised to call if there is no improvement in 2 day(s).            Prescriptions:       [New Rx] hydrOXYzine HCL 25 mg oral tablet [take 1 tablet (25 mg) by oral route at HS], #30 (thirty) tablets, Refills: 1 (one)             Charge Capture:         Primary Diagnosis:     R21  Rash and other nonspecific skin eruption           Orders:      [id number]  Office/outpatient visit; established patient, level 3  (In-House)